# Patient Record
Sex: MALE | Race: BLACK OR AFRICAN AMERICAN | NOT HISPANIC OR LATINO | URBAN - METROPOLITAN AREA
[De-identification: names, ages, dates, MRNs, and addresses within clinical notes are randomized per-mention and may not be internally consistent; named-entity substitution may affect disease eponyms.]

---

## 2024-01-08 ENCOUNTER — EMERGENCY (EMERGENCY)
Facility: HOSPITAL | Age: 89
LOS: 1 days | Discharge: HOME | End: 2024-01-08
Attending: STUDENT IN AN ORGANIZED HEALTH CARE EDUCATION/TRAINING PROGRAM | Admitting: STUDENT IN AN ORGANIZED HEALTH CARE EDUCATION/TRAINING PROGRAM
Payer: COMMERCIAL

## 2024-01-08 VITALS
OXYGEN SATURATION: 97 % | RESPIRATION RATE: 18 BRPM | DIASTOLIC BLOOD PRESSURE: 83 MMHG | TEMPERATURE: 98 F | HEIGHT: 66 IN | SYSTOLIC BLOOD PRESSURE: 131 MMHG | WEIGHT: 184.97 LBS | HEART RATE: 90 BPM

## 2024-01-08 VITALS
OXYGEN SATURATION: 98 % | HEART RATE: 90 BPM | SYSTOLIC BLOOD PRESSURE: 135 MMHG | RESPIRATION RATE: 17 BRPM | DIASTOLIC BLOOD PRESSURE: 73 MMHG

## 2024-01-08 PROCEDURE — 93971 EXTREMITY STUDY: CPT

## 2024-01-08 PROCEDURE — 93971 EXTREMITY STUDY: CPT | Mod: 26,RT

## 2024-01-08 PROCEDURE — 99053 MED SERV 10PM-8AM 24 HR FAC: CPT

## 2024-01-08 PROCEDURE — 99284 EMERGENCY DEPT VISIT MOD MDM: CPT

## 2024-01-08 PROCEDURE — 99284 EMERGENCY DEPT VISIT MOD MDM: CPT | Mod: 25

## 2024-01-08 NOTE — ED ADULT NURSE NOTE - CCCP TRG CHIEF CMPLNT
The history is provided by the patient. Back Pain    This is a chronic problem. The current episode started more than 2 days ago. The problem has not changed since onset. The problem occurs constantly. Patient reports not work related injury. The pain is associated with no known injury. The pain is present in the lumbar spine. The quality of the pain is described as shooting and aching. The pain radiates to the left thigh and right thigh. The pain is moderate. Pertinent negatives include no chest pain, no fever, no numbness, no headaches, no abdominal pain and no dysuria. Treatments tried: Tylenol. The treatment provided mild relief. Risk factors include a history of osteoporosis and a sedentary lifestyle.  The patient's surgical history non-contributory        Past Medical History:   Diagnosis Date    Adhesive capsulitis of shoulder     Anemia, unspecified     Anxiety state, unspecified     AR (allergic rhinitis)     Arthritis     osteo generalized    CAD (coronary artery disease) 1995    MI, angioplasty    Cancer ([facility] [address] 2002    prostate/xrt    Cerebrovascular disease, unspecified 5/27/2015    Cerumen impaction     Cervicalgia     Chronic ethmoidal sinusitis     Chronic frontal sinusitis     Chronic ischemic heart disease, unspecified     Chronic lymphadenitis     Chronic maxillary sinusitis     Chronic pain     lower back    Coronary atherosclerosis of native coronary artery 5/27/2015    Degeneration of lumbar or lumbosacral intervertebral disc     Deviated septum     ED (erectile dysfunction)     Fatigue     GERD (gastroesophageal reflux disease)     managed with medication     H/O prostate cancer 2002    treated with radiation    H/O seasonal allergies     managed with Nasocort spray    High frequency hearing loss     HLD (hyperlipidemia) 6/4/2014    Hypercholesteremia     managed with medication     Hypertension     controlled with meds    Hypertrophy of nasal turbinates     Loss of weight 8/13/2013    Malignant neoplasm of prostate ([facility]) 8/13/2013    MI (myocardial infarction) ([facility]) 1995    Nasal obstruction     Neuralgia, neuritis, and radiculitis, unspecified     Night sweat 8/19/2013    OA (osteoarthritis)     Obstructive sleep apnea 10/23/2013    sinus surgery corrected sleep apnea    Organic hypersomnia, 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 Yuniel Lee      multiple TREVOR    HX OTHER SURGICAL  2016    sinus     HX PTCA  1995    angioplasty    HX TONSILLECTOMY  childhood    SINUS SURGERY PROC UNLISTED  5/5/2016    sinus surgery-Dr Nick Wilson         Family History:   Problem Relation Age of Onset    Heart Disease Father     Prostate Cancer Father     Heart Disease Mother     Stroke Mother     Cancer Sister         breast       Social History     Socioeconomic History    Marital status:      Spouse name: Not on file    Number of children: Not on file    Years of education: Not on file    Highest education level: Not on file   Occupational History    Not on file   Social Needs    Financial resource strain: Not on file    Food insecurity:     Worry: Not on file     Inability: Not on file    Transportation needs:     Medical: Not on file     Non-medical: Not on file   Tobacco Use    Smoking status: Current Some Day Smoker     Packs/day: 0.25     Years: 3.00     Pack years: 0.75    Smokeless tobacco: Never Used    Tobacco comment: Pipe and cigar   Substance and Sexual Activity    Alcohol use: Yes     Comment: rare occ 1 or 2 beers in summer, states he is only smoking electric cig occ.  Drug use: No     Types: Prescription, OTC    Sexual activity: Not on file   Lifestyle    Physical activity:     Days per week: Not on file     Minutes per session: Not on file    Stress: Not on file   Relationships    Social connections:     Talks on phone: Not on file     Gets together: Not on file     Attends Yarsanism service: Not on file     Active member of club or organization: Not on file     Attends meetings of clubs or organizations: Not on file     Relationship status: Not on file    Intimate partner violence:     Fear of current or ex partner: Not on file     Emotionally abused: Not on file     Physically abused: Not on file     Forced sexual activity: Not on file   Other Topics Concern    Not on file   Social History Narrative    Not on file         ALLERGIES: Antihistamines; Other medication; and Statins-hmg-coa reductase inhibitors    Review of Systems   Constitutional: Negative for activity change, chills, diaphoresis and fever. HENT: Negative for dental problem, hearing loss, nosebleeds, rhinorrhea and sore throat. Eyes: Negative for pain, discharge, redness and visual disturbance. Respiratory: Negative for cough, chest tightness and shortness of breath. Cardiovascular: Negative for chest pain, palpitations and leg swelling. Gastrointestinal: Negative for abdominal pain, constipation, diarrhea, nausea and vomiting. Endocrine: Negative for cold intolerance, heat intolerance, polydipsia and polyuria. Genitourinary: Negative for dysuria and flank pain. Musculoskeletal: Positive for arthralgias, back pain, gait problem and myalgias. Negative for joint swelling and neck pain. Skin: Negative for pallor and rash. Allergic/Immunologic: Negative for environmental allergies and food allergies.    Neurological: Negative for dizziness, tremors, light-headedness, numbness and headaches. Hematological: Negative for adenopathy. Does not bruise/bleed easily. Psychiatric/Behavioral: Negative for confusion and dysphoric mood. The patient is not nervous/anxious and is not hyperactive. All other systems reviewed and are negative. Vitals:    10/29/19 1243   BP: 119/71   Pulse: 89   Resp: 16   Temp: 97.8 °F (36.6 °C)   SpO2: 96%   Weight: 61.7 kg (136 lb)   Height: 5' 6\" (1.676 m)            Physical Exam   Constitutional: He is oriented to person, place, and time. He appears well-developed and well-nourished. He appears distressed. HENT:   Head: Normocephalic and atraumatic. Mouth/Throat: No oropharyngeal exudate. Eyes: Pupils are equal, round, and reactive to light. Conjunctivae and EOM are normal. No scleral icterus. Neck: Normal range of motion. Neck supple. No JVD present. No thyromegaly present. Cardiovascular: Normal rate, regular rhythm, normal heart sounds and intact distal pulses. Exam reveals no gallop and no friction rub. No murmur heard. Pulmonary/Chest: Effort normal and breath sounds normal. No respiratory distress. He has no wheezes. Abdominal: Soft. Bowel sounds are normal. He exhibits no distension. There is no hepatosplenomegaly. There is no tenderness. Musculoskeletal: Normal range of motion. He exhibits no edema, tenderness or deformity. Back:    Neurological: He is alert and oriented to person, place, and time. No cranial nerve deficit or sensory deficit. He exhibits normal muscle tone. Coordination normal.   Skin: Skin is warm and dry. Capillary refill takes less than 2 seconds. No rash noted. Psychiatric: He has a normal mood and affect. His behavior is normal. Judgment and thought content normal.   Nursing note and vitals reviewed.        MDM  Number of Diagnoses or Management Options  Chronic bilateral low back pain with bilateral sciatica: established and worsening  Diagnosis management comments: 28-year-old male with an exacerbation of his chronic lumbar pain  Despite multiple attempts has been I am able to make contact with his pain management doctor to discuss a treatment for this acute flareup    I will restart lidocaine patches. Short course of prednisone. Add Ultram 50 for severe episodes. Amount and/or Complexity of Data Reviewed  Review and summarize past medical records: yes    Risk of Complications, Morbidity, and/or Mortality  Presenting problems: moderate  Diagnostic procedures: low  Management options: low  General comments: Elements of this note have been dictated via voice recognition software. Text and phrases may be limited by the accuracy of the software.   The chart has been reviewed, but errors may still be present.)      Patient Progress  Patient progress: stable         Procedures pain, lower leg

## 2024-01-08 NOTE — ED ADULT NURSE NOTE - NSFALLUNIVINTERV_ED_ALL_ED
Bed/Stretcher in lowest position, wheels locked, appropriate side rails in place/Call bell, personal items and telephone in reach/Instruct patient to call for assistance before getting out of bed/chair/stretcher/Non-slip footwear applied when patient is off stretcher/[place] to call system/Physically safe environment - no spills, clutter or unnecessary equipment/Purposeful proactive rounding/Room/bathroom lighting operational, light cord in reach Bed/Stretcher in lowest position, wheels locked, appropriate side rails in place/Call bell, personal items and telephone in reach/Instruct patient to call for assistance before getting out of bed/chair/stretcher/Non-slip footwear applied when patient is off stretcher/[place] to call system/Physically safe environment - no spills, clutter or unnecessary equipment/Purposeful proactive rounding/Room/bathroom lighting operational, light cord in reach

## 2024-01-08 NOTE — ED PROVIDER NOTE - CLINICAL SUMMARY MEDICAL DECISION MAKING FREE TEXT BOX
RLE edema, atraumatic, wwp. Will US to r/o DVT.  Dry skin, excoriated, bleeding 2/2 scratching, now stopped. No evidence of infection. Will treat w ointment. RLE edema, atraumatic, wwp. Will US to r/o DVT.  Dry skin, excoriated, bleeding 2/2 scratching, now stopped. No evidence of infection. Will treat w vasoline ointment.

## 2024-01-08 NOTE — ED PROVIDER NOTE - OBJECTIVE STATEMENT
87 yo M w no stated PMH p/w R leg swelling and bleeding since today. Pt also c/o itching and dry skin throughout trunk and extremities for >1 mo. He had bleeding of RLE 2/2 scratching skin, bandaged by EMS, hemostasis achieved PTA in ED. RLE is diffusely edematous below shin, no trauma or pain, No cough, hemoptysis, exogenous estrogen, recent travel, surgery, malignancy, personal or FHx of VTE.

## 2024-01-08 NOTE — ED PROVIDER NOTE - NSFOLLOWUPINSTRUCTIONS_ED_ALL_ED_FT
Follow up with your primary medical doctor as soon as possible.  Follow up with dermatology - Call [phone] to schedule an appointment.  Return to the emergency department if your symptoms worsen or if you develop new symptoms.  If you have any problems with followup, please call the ED Referral Coordinator at [phone].    Antipruritic (On the skin)  Treats itchy skin caused by psoriasis, seborrhea, dandruff, or other conditions. Some forms of this medicine treat scaling and dry skin, which may cause itching.    Brand Name(s):AmLactin,AmLactin Foot Cream Therapy,Amlactin Distribution Pack,Anti-Itch,Anti-Itch Cream,Aveeno Anti-Itch,Balnetar,Banophen,Benadryl,Benadryl Extra Strength Itch Relief Spray,Benadryl Extra Strength Itch Stopping Cream,Benadryl Itch Relief Stick,Benadryl Itch Stopping Gel,Caldyphen Clear,DHS Tar  There may be other brand names for this medicine.    When This Medicine Should Not Be Used:  You should not use this medicine if you have had an allergic reaction to any of the ingredients.    How to Use This Medicine:  Oil, Spray, Shampoo, Soap, Liquid, Gel/Jelly, Ointment, Cream, Lotion    Your doctor will tell you how much medicine to use. Do not use more than directed.  Follow the instructions on the medicine label if you are using this medicine without a prescription.  If you are using this medicine with a prescription, the medicine might come with patient instructions. Read and follow these instructions carefully. Ask your doctor or pharmacist if you have any questions.  This medicine is for use on your skin only. Do not get it in your eyes, nose, or mouth. If it does get on these areas, rinse it off right away. Some forms of this medicine should not be used on your face, genitals, or rectum. If you are not sure what body areas you can use this medicine on, ask your pharmacist.  For the cream, soap, oil, liquid, lotion, ointment, or gel: Apply a thin layer of the medicine to the affected area. Rub it in gently. You might need to shake the liquid medicine before you use it.  To use the shampoo: Wet your hair and scalp thoroughly with water. Work the shampoo into a lather and gently massage it over your whole scalp for 3 to 5 minutes. Rinse your hair thoroughly. Wash your hair again and leave the shampoo on your scalp for another 3 to 5 minutes. Use at least twice a week for best results, or as directed by your doctor.  Do not use the spray near heat or open flame, or while smoking.  If a dose is missed:    Apply a dose as soon as you can. If it is almost time for your next dose, wait until then and apply a regular dose. Do not apply extra medicine to make up for a missed dose.  How to Store and Dispose of This Medicine:    Store the medicine in a closed container at room temperature, away from heat, moisture, and direct light. Do not freeze.  Ask your pharmacist or doctor how to dispose of the medicine container and any leftover or  medicine.  Keep all medicine out of the reach of children. Never share your medicine with anyone.  Drugs and Foods to Avoid:  Ask your doctor or pharmacist before using any other medicine, including over-the-counter medicines, vitamins, and herbal products.    Do not put cosmetics or skin care products on the treated skin.  Ask your doctor before you start taking any oral medicine while you are using this topical medicine. This includes non-prescription (over-the-counter) medicines.  Warnings While Using This Medicine:    Make sure your doctor knows if you are pregnant or breast feeding, or if you are allergic to preservatives or dyes.  Call your doctor if your symptoms do not improve or if they get worse. Let your doctor know if your symptoms go away but come back again within a few days.  This medicine may make your skin more sensitive to sunlight. Wear sunscreen. Do not use sunlamps or tanning beds.  Do not use this medicine to treat a skin problem your doctor has not examined.  Possible Side Effects While Using This Medicine:  Call your doctor right away if you notice any of these side effects:    Allergic reaction: Itching or hives, swelling in your face or hands, swelling or tingling in your mouth or throat, chest tightness, trouble breathing  Stinging or burning on your skin.  If you notice these less serious side effects, talk with your doctor:    Rash.  If you notice other side effects that you think are caused by this medicine, tell your doctor.  Call your doctor for medical advice about side effects. You may report side effects to FDA at 7-570-XJH-9744 Follow up with your primary medical doctor as soon as possible.  Follow up with dermatology - Call [phone] to schedule an appointment.  Return to the emergency department if your symptoms worsen or if you develop new symptoms.  If you have any problems with followup, please call the ED Referral Coordinator at [phone].    Antipruritic (On the skin)  Treats itchy skin caused by psoriasis, seborrhea, dandruff, or other conditions. Some forms of this medicine treat scaling and dry skin, which may cause itching.    Brand Name(s):AmLactin,AmLactin Foot Cream Therapy,Amlactin Distribution Pack,Anti-Itch,Anti-Itch Cream,Aveeno Anti-Itch,Balnetar,Banophen,Benadryl,Benadryl Extra Strength Itch Relief Spray,Benadryl Extra Strength Itch Stopping Cream,Benadryl Itch Relief Stick,Benadryl Itch Stopping Gel,Caldyphen Clear,DHS Tar  There may be other brand names for this medicine.    When This Medicine Should Not Be Used:  You should not use this medicine if you have had an allergic reaction to any of the ingredients.    How to Use This Medicine:  Oil, Spray, Shampoo, Soap, Liquid, Gel/Jelly, Ointment, Cream, Lotion    Your doctor will tell you how much medicine to use. Do not use more than directed.  Follow the instructions on the medicine label if you are using this medicine without a prescription.  If you are using this medicine with a prescription, the medicine might come with patient instructions. Read and follow these instructions carefully. Ask your doctor or pharmacist if you have any questions.  This medicine is for use on your skin only. Do not get it in your eyes, nose, or mouth. If it does get on these areas, rinse it off right away. Some forms of this medicine should not be used on your face, genitals, or rectum. If you are not sure what body areas you can use this medicine on, ask your pharmacist.  For the cream, soap, oil, liquid, lotion, ointment, or gel: Apply a thin layer of the medicine to the affected area. Rub it in gently. You might need to shake the liquid medicine before you use it.  To use the shampoo: Wet your hair and scalp thoroughly with water. Work the shampoo into a lather and gently massage it over your whole scalp for 3 to 5 minutes. Rinse your hair thoroughly. Wash your hair again and leave the shampoo on your scalp for another 3 to 5 minutes. Use at least twice a week for best results, or as directed by your doctor.  Do not use the spray near heat or open flame, or while smoking.  If a dose is missed:    Apply a dose as soon as you can. If it is almost time for your next dose, wait until then and apply a regular dose. Do not apply extra medicine to make up for a missed dose.  How to Store and Dispose of This Medicine:    Store the medicine in a closed container at room temperature, away from heat, moisture, and direct light. Do not freeze.  Ask your pharmacist or doctor how to dispose of the medicine container and any leftover or  medicine.  Keep all medicine out of the reach of children. Never share your medicine with anyone.  Drugs and Foods to Avoid:  Ask your doctor or pharmacist before using any other medicine, including over-the-counter medicines, vitamins, and herbal products.    Do not put cosmetics or skin care products on the treated skin.  Ask your doctor before you start taking any oral medicine while you are using this topical medicine. This includes non-prescription (over-the-counter) medicines.  Warnings While Using This Medicine:    Make sure your doctor knows if you are pregnant or breast feeding, or if you are allergic to preservatives or dyes.  Call your doctor if your symptoms do not improve or if they get worse. Let your doctor know if your symptoms go away but come back again within a few days.  This medicine may make your skin more sensitive to sunlight. Wear sunscreen. Do not use sunlamps or tanning beds.  Do not use this medicine to treat a skin problem your doctor has not examined.  Possible Side Effects While Using This Medicine:  Call your doctor right away if you notice any of these side effects:    Allergic reaction: Itching or hives, swelling in your face or hands, swelling or tingling in your mouth or throat, chest tightness, trouble breathing  Stinging or burning on your skin.  If you notice these less serious side effects, talk with your doctor:    Rash.  If you notice other side effects that you think are caused by this medicine, tell your doctor.  Call your doctor for medical advice about side effects. You may report side effects to FDA at 3-748-FYE-8158

## 2024-01-08 NOTE — ED PROVIDER NOTE - PATIENT PORTAL LINK FT
You can access the FollowMyHealth Patient Portal offered by [facility] by registering at the following website: [email or web address]. By joining [facility]’s FollowMyHealth portal, you will also be able to view your health information using other applications (apps) compatible with our system. You can access the FollowMyHealth Patient Portal offered by [facility] by registering at the following website: [email or web address]. By joining [facility]’s FollowMyHealth portal, you will also be able to view your health information using other applications (apps) compatible with our system.

## 2024-01-08 NOTE — ED ADULT TRIAGE NOTE - CHIEF COMPLAINT QUOTE
pain swelling to right leg; also with heavy bleeding to right leg (now controlled- pressure dressing done by EMS) after scratching tonight

## 2024-01-08 NOTE — ED PROVIDER NOTE - NS ED ROS FT
CONSTITUTIONAL: No fever, no chills, no fatigue  EYES: No eye redness, no visual changes  ENT: No ear pain, no sore throat  CARDIOVASCULAR: No chest pain, no palpitations  RESPIRATORY: No cough, no SOB  GI: No abdominal pain, no nausea, no vomiting, no constipation, no diarrhea  GENITOURINARY: No dysuria, no frequency, no hematuria  MUSCULOSKELETAL: No back pain, no joint pain, no myalgias  SKIN: No rash, + RLE peripheral edema, + RLE bleeding, + itching, + dry skin  NEURO: No headache, no confusion    ALL OTHER SYSTEMS NEGATIVE.

## 2024-01-08 NOTE — ED ADULT NURSE NOTE - OBJECTIVE STATEMENT
pt c/o flaky dry skin, itchiness all over body. pt scratched right lower leg tonight, causing bleeding. controlled by ems with pressure bandage. skin on extremities appear dry, worse on lower legs. scratch wounds on right leg

## 2024-01-10 DIAGNOSIS — M79.89 OTHER SPECIFIED SOFT TISSUE DISORDERS: ICD-10-CM

## 2024-01-10 DIAGNOSIS — L29.9 PRURITUS, UNSPECIFIED: ICD-10-CM

## 2024-01-10 DIAGNOSIS — R60.9 EDEMA, UNSPECIFIED: ICD-10-CM
